# Patient Record
Sex: FEMALE | Race: WHITE | URBAN - METROPOLITAN AREA
[De-identification: names, ages, dates, MRNs, and addresses within clinical notes are randomized per-mention and may not be internally consistent; named-entity substitution may affect disease eponyms.]

---

## 2022-02-16 ENCOUNTER — OFFICE VISIT (OUTPATIENT)
Dept: URBAN - METROPOLITAN AREA CLINIC 1 | Facility: CLINIC | Age: 82
End: 2022-02-16
Payer: COMMERCIAL

## 2022-02-16 DIAGNOSIS — H16.149 PUNCTATE KERATITIS, UNSPECIFIED EYE: ICD-10-CM

## 2022-02-16 DIAGNOSIS — H04.123 DRY EYE SYNDROME OF BILATERAL LACRIMAL GLANDS: Primary | ICD-10-CM

## 2022-02-16 PROCEDURE — 99212 OFFICE O/P EST SF 10 MIN: CPT | Performed by: SPECIALIST

## 2022-02-16 ASSESSMENT — INTRAOCULAR PRESSURE
OD: 15
OS: 16

## 2022-02-16 ASSESSMENT — VISUAL ACUITY
OS: 20/30
OD: 20/30

## 2022-02-16 NOTE — IMPRESSION/PLAN
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 Plan: Discussed diagnosis in detail with patient. Will continue to observe condition and or symptoms.   patient to use AT's QID OU

## 2022-02-16 NOTE — IMPRESSION/PLAN
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 Plan: Discussed diagnosis in detail with patient. Will continue to observe condition and or symptoms.   patient to use AT's QID OU

## 2022-02-16 NOTE — IMPRESSION/PLAN
Impression: Dry eye syndrome of bilateral lacrimal glands: H04.123. Plan: Dry eyes noted on today's exam. There is no evidence of permanent changes to the cornea. Explained condition does not have a cure and will need artificial tears for maintenance.